# Patient Record
Sex: MALE | Race: WHITE | Employment: OTHER | URBAN - METROPOLITAN AREA
[De-identification: names, ages, dates, MRNs, and addresses within clinical notes are randomized per-mention and may not be internally consistent; named-entity substitution may affect disease eponyms.]

---

## 2017-02-28 ASSESSMENT — ENCOUNTER SYMPTOMS
MUSCLE CRAMPS: 1
EYE PAIN: 1
EYE REDNESS: 0
EYE WATERING: 0
WEAKNESS: 0
WEIGHT LOSS: 0
EYE IRRITATION: 1
NUMBNESS: 1
NIGHT SWEATS: 0
DIZZINESS: 1
DISTURBANCES IN COORDINATION: 0
PARALYSIS: 0
FATIGUE: 1
SEIZURES: 0
MUSCLE WEAKNESS: 1
ARTHRALGIAS: 1
BACK PAIN: 0
HALLUCINATIONS: 0
JOINT SWELLING: 1
STIFFNESS: 1
HEADACHES: 0
NECK PAIN: 1
INCREASED ENERGY: 1
DECREASED APPETITE: 0
TREMORS: 0
POLYDIPSIA: 1
DOUBLE VISION: 1
MYALGIAS: 1
LOSS OF CONSCIOUSNESS: 0
SPEECH CHANGE: 0
FEVER: 1
ALTERED TEMPERATURE REGULATION: 1
WEIGHT GAIN: 0
POLYPHAGIA: 0
MEMORY LOSS: 0
CHILLS: 1
TINGLING: 1

## 2017-03-13 ENCOUNTER — OFFICE VISIT (OUTPATIENT)
Dept: NEUROLOGY | Facility: CLINIC | Age: 77
End: 2017-03-13

## 2017-03-13 VITALS
DIASTOLIC BLOOD PRESSURE: 63 MMHG | OXYGEN SATURATION: 96 % | BODY MASS INDEX: 31.92 KG/M2 | WEIGHT: 223 LBS | SYSTOLIC BLOOD PRESSURE: 116 MMHG | HEIGHT: 70 IN | HEART RATE: 57 BPM

## 2017-03-13 DIAGNOSIS — G70.01 MYASTHENIA GRAVIS WITH EXACERBATION (H): ICD-10-CM

## 2017-03-13 DIAGNOSIS — G70.00 MYASTHENIA GRAVIS WITHOUT EXACERBATION (H): ICD-10-CM

## 2017-03-13 DIAGNOSIS — G70.00 MYASTHENIA GRAVIS WITHOUT EXACERBATION (H): Primary | ICD-10-CM

## 2017-03-13 ASSESSMENT — PAIN SCALES - GENERAL: PAINLEVEL: NO PAIN (0)

## 2017-03-13 NOTE — PROGRESS NOTES
"History of illness:    Cecil Gupta is a 76 year old man who carries a diagnosis of myasthenia gravis. He has previously been seen by Dr. Lovell at [facility] and also at [facility] by Dr. Jenkins. Prior work up has included single fiber (no jitter), ACHR ab and Musk (negative), chest CT (no thymic abnormality), and tensilon test (reportedly positive). It is somewhat hard to know when his ocular symptoms began. He has a long history of vision disturbance which followed retinal detachment 20+ years ago, cataracts (both eyes), and vitrectomy (right eye). In March 2013 his vision changed. At that point he developed marked diplopia. Diplopia worsened throughout the day. Around the same time he developed right eye lid ptosis. Ptosis was less severe on the left. Ptosis seemed to fluctuate somewhat. MG was diagnosed on symptoms and the tensilon test (documented results not available). After the diagnosis he was treated with mestinon. He thinks he was taking 60 mg 3 times daily. He thinks this helped \"a little bit\". He stopped it because it was not well tolerated.  He was then treated prednisone 50 mg daily with a tapering course over several months. He did not feel that prednisone was helpful. Ptosis and diplopia persisted essentially unchanged. He did not tolerate prednisone well at all. In 2014 he started IVIg. He continued 0.8 gm/kg monthly. The dose was stable between 2014 and 2016. He reported some relatively minor treatment related changes (slightly more ptosis and diplopia). In addition to the ocular symptoms he reports 30+ years of trouble swallowing. He has no problem chewing or initiating swallow, but feels that food gets stuck. No dysarthria. He also feels that he becomes short of breath. In 11/14 he also underwent upper lid blepharoplasty and right levator ptosis repair. 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 Around 2013 he was thought to have \"encephalitis\". His symptoms from this was really just a sense of coldness throughout his body and severe fatigue. Other diagnostic data is unknown. He carries a diagnosis of seronegative rheumatoid arthritis. He takes no immunotherapy specific for this condition.       Interval history:  I last saw him 11/14/16. After that visit NCS were performed. No decrement was seen in facial muscles. Over the last few months he has been under an extraordinary amount of stress in his home life. After I last saw him I suggested he decrease his IVIg. He decided not do this. He is still receiving 0.8 gm/kg q 4 weeks. In part due to his life stress he has not been able to keep this schedule. His last dose was 6 weeks ago. During this delay he feels \"colder\". This is his typical symptom that prompts him to take IVIg.  He reports some continued diplopia when he gets tired. This perhaps is a bit worse, but he still experiences diplopia even after IVIg. His ptosis is unchanged today. He has been, by necessity, fairly active over the last few months. He reports feeling \"winded\" with activity. His FVC today is 108% upright and 106% supine. MIP -110.     Prior pertinent laboratory work-up:  None    Prior imaging:  10/16: MRI LS spine showed scattered \"marked\" degenerative changes, partial ankylosis at L2 and L3  10/16: MRI C spine showed moderate to marked degnerative chnages and moderate to marked stenosis at C5C6    Prior electrophysiologic work-up:  8/13: Single fiber EMG of right frontalis showed normal jitter. No evidence for NMJ defect.  6/13: NCS performed at [facility]. Sural, SP, median, ulnar snap's all normal (my interpretation). Left peroneal motor small at 1.5 mV, and tibial at 2 mV. There was some ulnar motor slowing across the elbow. EMG essentially normal other than a few large MUPs in TA and FDI. No convincing decrement.   11/16: NCS of left facial-nasalis and ulnar-ADM 3 " Hz repetitive nerve stimulation was normal. No decrement or exhaustion was present.      Past Medical History:   Past Medical History   Diagnosis Date     Allergic rhinitis      Ankylosing spondylitis (H)      Aortic insufficiency      Arthritis      Asplenia      Traumatic     Carbuncle and furuncle of buttock      Heart attack (H)      Hyperlipidemia      Hypertension      Impotence of organic origin      Major depression, recurrent (H)      MRSA (methicillin resistant staph aureus) culture positive      Narcolepsy without cataplexy      Obesity      Obstructive sleep apnea      Ocular myasthenia (H)      Other chronic pain      Renal insufficiency      Retinal detachment right eye     Rheumatoid lung (H)      Sensory neuropathy (H)      Ulnar neuropathy at elbow      Vitamin D deficiency      Past Surgical History:  Past Surgical History   Procedure Laterality Date     Hrw anes total knee replacement, mda 1  bilateral     Joint replacement, hip rt/lt       Transposition ulnar nerve (elbow)       Splenectomy[       Laser yag capsulotomy bilateral  6/11/2014     Procedure: LASER YAG CAPSULOTOMY BILATERAL;  Surgeon: Geovanni Elliott MD;  Location: [facility]     Septoplasty       C total hip arthroplasty       Retinal reattachment       Blepharoplasty bilateral Bilateral 11/13/2014     Procedure: BLEPHAROPLASTY BILATERAL;  Surgeon: Geovanni Elliott MD;  Location: [facility]     Repair ptosis Right 11/13/2014     Procedure: REPAIR PTOSIS;  Surgeon: Geovanni Elliott MD;  Location: [facility]     Repair ptosis Right 6/22/2015     Procedure: REPAIR PTOSIS;  Surgeon: Geovanni Elliott MD;  Location: [facility]     Family history:    There is no known family history of myopathy or other neuromuscular disorders.    Social History:    He denies tobacco, alcohol, or illicit drug use.     Medical Allergies:     Allergies   Allergen Reactions     Ace Inhibitors      Cats      Diovan [Valsartan]      Irbesartan      Current Medications:   Current  "Outpatient Prescriptions   Medication     immune globulin - sucrose free 10 % injection     amLODIPine-atorvastatin (CADUET) 5-10 MG per tablet     amoxicillin (AMOXIL) 500 MG capsule     B Complex CAPS     chlorthalidone (HYGROTON) 25 MG tablet     VITAMIN D, CHOLECALCIFEROL, PO     guaiFENesin-codeine (ROBITUSSIN AC) 100-10 MG/5ML SOLN     Metoprolol Succinate (TOPROL XL PO)     Modafinil (PROVIGIL PO)     Potassium Chloride Chaya CR (K-DUR PO)     TRAZODONE HCL PO     cetirizine (ZYRTEC) 10 MG tablet     No current facility-administered medications for this visit.      Review of Systems: A complete review of systems was obtained and was negative except for what was noted above.    Physical examination:    /63  Pulse 57  Ht 1.765 m (5' 9.5\")  Wt 101.2 kg (223 lb)  SpO2 96%  BMI 32.46 kg/m2    General Appearance: NAD    Skin: There are no rashes or other skin lesions.    Musculoskeletal:  Pes planus present.     Neurologic examination:    Mental status:  Patient is alert, attentive, and oriented x 3.  Language is coherent and fluent without dysarthria or aphasia.  Memory, comprehension and ability to follow commands were intact.       Cranial nerves:  There is a slight degree of left eye ptosis at baseline, and eye closure is moderately weak. Ptosis gets a bit worse after sustained upgaze. No diplopia at baseline, but there was diplopia after brief lateral gaze. There was no jaw, palate or tongue weakness or atrophy. Hearing was grossly intact.  Shoulder shrug was normal.       Motor exam: No atrophy or fasciculations. He has some mild FDI weakness on both sides, but otherwise MRC grade 5/5 strength throughout the remaining proximal and distal muscles of the arms and legs. He can easily rise from a seated position without assistance.     Complex motor skills: Mild postural hand tremor. No ataxia     Sensory exam: Decreased pin in toes, otherwise normal. Vibration normal.     Gait was narrow and stable     Deep " "tendon reflexes:   Right Left   Triceps 2 2   Biceps 2 2   Brachioradialis 2 2   Knee jerk 2 2   Ankle jerk 1 1   Plantar responses were flexor bilaterally.       Assessment:    Cecil Gupta is a 76 year old man with a very complex history of fluctuating diplopia and ptosis, within the context of many other ocular conditions including retinal detachment, cataracts surgery, vitrectomy, corneal transplant, upper lid blepharoplasty and right levator ptosis repair. Recent RNS showed no decrement in facial muscles. 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 He remains on IVIg and reports subjective improvements in \"coldness\", perhaps ptosis as well. I do not understand the cold sensation he experiences, or why this improves with IVIg. It is not from MG. Even so, it is a meaningful benefit for him. I again discussed MG treatment with him today, and again recommended dose reduction. Knowing that he is now 6 weeks out from MG with normal spirometry and essentially stable examination is reassuring. We discussed this in some detail today. He will continue IVIg under the direction of Dr. Lovell. I am more than happy to become more engaged if needed. I will see Mr. Gupta back in 6 months. Additional IVIg weaning may be recommended pending clinical course.     Plan:      1. Decrease IVIg from 0.8 gm/kg q 4 weeks to 0.4 gm/kg q 4 weeks.  2. If stable at next visit may suggest further tapering, probably by extending infusion interval to q 6 qweeks.   3. I will leave IVIg prescribing to the excellent case that he has received from Dr. Lovell, but am happy to become more involved as needed.  4. I will see him back in 6 months. Sooner if needed.   ---      "

## 2017-03-13 NOTE — LETTER
"3/13/2017       RE: Cecil Gupta  [address] [place] [address]     Dear Colleague,    Thank you for referring your patient, Cecil Gupta, to the [facility]UROLOG[facility] at [facility]. Please see a copy of my visit note below.    History of illness:    Cecil Gupta is a 76 year old man who carries a diagnosis of myasthenia gravis. He has previously been seen by Dr. Lovell at [facility] and also at [facility] by Dr. Jenkins. Prior work up has included single fiber (no jitter), ACHR ab and Musk (negative), chest CT (no thymic abnormality), and tensilon test (reportedly positive). It is somewhat hard to know when his ocular symptoms began. He has a long history of vision disturbance which followed retinal detachment 20+ years ago, cataracts (both eyes), and vitrectomy (right eye). In March 2013 his vision changed. At that point he developed marked diplopia. Diplopia worsened throughout the day. Around the same time he developed right eye lid ptosis. Ptosis was less severe on the left. Ptosis seemed to fluctuate somewhat. MG was diagnosed on symptoms and the tensilon test (documented results not available). After the diagnosis he was treated with mestinon. He thinks he was taking 60 mg 3 times daily. He thinks this helped \"a little bit\". He stopped it because it was not well tolerated.  He was then treated prednisone 50 mg daily with a tapering course over several months. He did not feel that prednisone was helpful. Ptosis and diplopia persisted essentially unchanged. He did not tolerate prednisone well at all. In 2014 he started IVIg. He continued 0.8 gm/kg monthly. The dose was stable between 2014 and 2016. He reported some relatively minor treatment related changes (slightly more ptosis and diplopia). In addition to the ocular symptoms he reports 30+ years of trouble swallowing. He has no problem chewing or initiating swallow, but feels that food gets " "stuck. No dysarthria. He also feels that he becomes short of breath. In 11/14 he also underwent upper lid blepharoplasty and right levator ptosis repair. This is on top of the remote retinal detachment and right eye vitrectomy as well as cataract surgery in both eyes and (in 2016) a right corneal transplant. Around 2013 he was thought to have \"encephalitis\". His symptoms from this was really just a sense of coldness throughout his body and severe fatigue. Other diagnostic data is unknown. He carries a diagnosis of seronegative rheumatoid arthritis. He takes no immunotherapy specific for this condition.       Interval history:  I last saw him 11/14/16. After that visit NCS were performed. No decrement was seen in facial muscles. Over the last few months he has been under an extraordinary amount of stress in his home life. After I last saw him I suggested he decrease his IVIg. He decided not do this. He is still receiving 0.8 gm/kg q 4 weeks. In part due to his life stress he has not been able to keep this schedule. His last dose was 6 weeks ago. During this delay he feels \"colder\". This is his typical symptom that prompts him to take IVIg.  He reports some continued diplopia when he gets tired. This perhaps is a bit worse, but he still experiences diplopia even after IVIg. His ptosis is unchanged today. He has been, by necessity, fairly active over the last few months. He reports feeling \"winded\" with activity. His FVC today is 108% upright and 106% supine. MIP -110.     Prior pertinent laboratory work-up:  None    Prior imaging:  10/16: MRI LS spine showed scattered \"marked\" degenerative changes, partial ankylosis at L2 and L3  10/16: MRI C spine showed moderate to marked degnerative chnages and moderate to marked stenosis at C5C6    Prior electrophysiologic work-up:  8/13: Single fiber EMG of right frontalis showed normal jitter. No evidence for NMJ defect.  6/13: NCS performed at [facility]. Sural, SP, median, ulnar " snap's all normal (my interpretation). Left peroneal motor small at 1.5 mV, and tibial at 2 mV. There was some ulnar motor slowing across the elbow. EMG essentially normal other than a few large MUPs in TA and FDI. No convincing decrement.   11/16: NCS of left facial-nasalis and ulnar-ADM 3 Hz repetitive nerve stimulation was normal. No decrement or exhaustion was present.      Past Medical History:   Past Medical History   Diagnosis Date     Allergic rhinitis      Ankylosing spondylitis (H)      Aortic insufficiency      Arthritis      Asplenia      Traumatic     Carbuncle and furuncle of buttock      Heart attack (H)      Hyperlipidemia      Hypertension      Impotence of organic origin      Major depression, recurrent (H)      MRSA (methicillin resistant staph aureus) culture positive      Narcolepsy without cataplexy      Obesity      Obstructive sleep apnea      Ocular myasthenia (H)      Other chronic pain      Renal insufficiency      Retinal detachment right eye     Rheumatoid lung (H)      Sensory neuropathy (H)      Ulnar neuropathy at elbow      Vitamin D deficiency      Past Surgical History:  Past Surgical History   Procedure Laterality Date     Hrw anes total knee replacement, mda 1  bilateral     Joint replacement, hip rt/lt       Transposition ulnar nerve (elbow)       Splenectomy[       Laser yag capsulotomy bilateral  6/11/2014     Procedure: LASER YAG CAPSULOTOMY BILATERAL;  Surgeon: Geovanni Elliott MD;  Location: [facility]     Septoplasty       C total hip arthroplasty       Retinal reattachment       Blepharoplasty bilateral Bilateral 11/13/2014     Procedure: BLEPHAROPLASTY BILATERAL;  Surgeon: Geovanni Elliott MD;  Location: [facility]     Repair ptosis Right 11/13/2014     Procedure: REPAIR PTOSIS;  Surgeon: Geovanni Elliott MD;  Location: [facility]     Repair ptosis Right 6/22/2015     Procedure: REPAIR PTOSIS;  Surgeon: Geovanni Elliott MD;  Location: [facility]     Family history:    There is no known family  "history of myopathy or other neuromuscular disorders.    Social History:    He denies tobacco, alcohol, or illicit drug use.     Medical Allergies:     Allergies   Allergen Reactions     Ace Inhibitors      Cats      Diovan [Valsartan]      Irbesartan      Current Medications:   Current Outpatient Prescriptions   Medication     immune globulin - sucrose free 10 % injection     amLODIPine-atorvastatin (CADUET) 5-10 MG per tablet     amoxicillin (AMOXIL) 500 MG capsule     B Complex CAPS     chlorthalidone (HYGROTON) 25 MG tablet     VITAMIN D, CHOLECALCIFEROL, PO     guaiFENesin-codeine (ROBITUSSIN AC) 100-10 MG/5ML SOLN     Metoprolol Succinate (TOPROL XL PO)     Modafinil (PROVIGIL PO)     Potassium Chloride Chaya CR (K-DUR PO)     TRAZODONE HCL PO     cetirizine (ZYRTEC) 10 MG tablet     No current facility-administered medications for this visit.      Review of Systems: A complete review of systems was obtained and was negative except for what was noted above.    Physical examination:    /63  Pulse 57  Ht 1.765 m (5' 9.5\")  Wt 101.2 kg (223 lb)  SpO2 96%  BMI 32.46 kg/m2    General Appearance: NAD    Skin: There are no rashes or other skin lesions.    Musculoskeletal:  Pes planus present.     Neurologic examination:    Mental status:  Patient is alert, attentive, and oriented x 3.  Language is coherent and fluent without dysarthria or aphasia.  Memory, comprehension and ability to follow commands were intact.       Cranial nerves:  There is a slight degree of left eye ptosis at baseline, and eye closure is moderately weak. Ptosis gets a bit worse after sustained upgaze. No diplopia at baseline, but there was diplopia after brief lateral gaze. There was no jaw, palate or tongue weakness or atrophy. Hearing was grossly intact.  Shoulder shrug was normal.       Motor exam: No atrophy or fasciculations. He has some mild FDI weakness on both sides, but otherwise MRC grade 5/5 strength throughout the remaining " "proximal and distal muscles of the arms and legs. He can easily rise from a seated position without assistance.     Complex motor skills: Mild postural hand tremor. No ataxia     Sensory exam: Decreased pin in toes, otherwise normal. Vibration normal.     Gait was narrow and stable     Deep tendon reflexes:   Right Left   Triceps 2 2   Biceps 2 2   Brachioradialis 2 2   Knee jerk 2 2   Ankle jerk 1 1   Plantar responses were flexor bilaterally.       Assessment:    Cecil Gupta is a 76 year old man with a very complex history of fluctuating diplopia and ptosis, within the context of many other ocular conditions including retinal detachment, cataracts surgery, vitrectomy, corneal transplant, upper lid blepharoplasty and right levator ptosis repair. Recent RNS showed no decrement in facial muscles. 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 He remains on IVIg and reports subjective improvements in \"coldness\", perhaps ptosis as well. I do not understand the cold sensation he experiences, or why this improves with IVIg. It is not from MG. Even so, it is a meaningful benefit for him. I again discussed MG treatment with him today, and again recommended dose reduction. Knowing that he is now 6 weeks out from MG with normal spirometry and essentially stable examination is reassuring. We discussed this in some detail today. He will continue IVIg under the direction of Dr. Lovell. I am more than happy to become more engaged if needed. I will see Mr. Gupta back in 6 months. Additional IVIg weaning may be recommended pending clinical course.     Plan:      1. Decrease IVIg from 0.8 gm/kg q 4 weeks to 0.4 gm/kg q 4 weeks.  2. If stable at next visit may suggest further tapering, probably by extending infusion interval to q 6 qweeks.   3. I will leave IVIg prescribing to the excellent case that he has received from Dr. Lovell, but am happy to become more involved as needed.  4. I will " see him back in 6 months. Sooner if needed.   ---      Again, thank you for allowing me to participate in the care of your patient.      Sincerely,    Kodi Kirkpatrick MD

## 2017-03-13 NOTE — MR AVS SNAPSHOT
After Visit Summary   3/13/2017    Cecil Gupta    MRN: [id number]           Patient Information     Date Of Birth          1940        Visit Information        Provider Department      3/13/2017 8:00 AM Kodi Kirkpatrick MD [facility] Neurology        Today's Diagnoses     Myasthenia gravis without exacerbation (H)    -  1    Myasthenia gravis with exacerbation (H)           Follow-ups after your visit        Follow-up notes from your care team     Return in about 6 months (around 9/13/2017).      Who to contact     Please call your clinic at [phone] to:    Ask questions about your health    Make or cancel appointments    Discuss your medicines    Learn about your test results    Speak to your doctor   If you have compliments or concerns about an experience at your clinic, or if you wish to file a complaint, please contact [facility] Patient Relations at [phone] or email us at [email or web address]         Additional Information About Your Visit        MyChart Information     [employer] gives you secure access to your electronic health record. If you see a primary care provider, you can also send messages to your care team and make appointments. If you have questions, please call your primary care clinic.  If you do not have a primary care provider, please call [phone] and they will assist you.      [employer] is an electronic gateway that provides easy, online access to your medical records. With [employer], you can request a clinic appointment, read your test results, renew a prescription or communicate with your care team.     To access your existing account, please contact your [facility] or call [phone] for assistance.        Care EveryWhere ID     This is your Care EveryWhere ID. This could be used by other organizations to access your [place] medical records  [id number]        Your Vitals Were      "Pulse Height Pulse Oximetry BMI (Body Mass Index)          57 1.765 m (5' 9.5\") 96% 32.46 kg/m2         Blood Pressure from Last 3 Encounters:   03/13/17 116/63   11/14/16 130/73   06/22/15 103/68    Weight from Last 3 Encounters:   03/13/17 101.2 kg (223 lb)   06/19/15 98.7 kg (217 lb 9.6 oz)   06/16/15 98.9 kg (218 lb)               Primary Care Provider Office Phone # Fax #    Herman Monahan [phone]       [facility] [address] [facility] [address]        Thank you!     Thank you for choosing [facility] NEUROLOGY  for your care. Our goal is always to provide you with excellent care. Hearing back from our patients is one way we can continue to improve our services. Please take a few minutes to complete the written survey that you may receive in the mail after your visit with us. Thank you!             Your Updated Medication List - Protect others around you: Learn how to safely use, store and throw away your medicines at www.disposemymeds.org.          This list is accurate as of: 3/13/17  9:05 AM.  Always use your most recent med list.                   Brand Name Dispense Instructions for use    amLODIPine-atorvastatin 5-10 MG per tablet    CADUET     Take 1 tablet by mouth daily       amoxicillin 500 MG capsule    AMOXIL     Take 500 mg by mouth Four caps on ehour before dental work       B Complex Caps      Take 1 capsule by mouth daily       cetirizine 10 MG tablet    zyrTEC     Take 10 mg by mouth daily       chlorthalidone 25 MG tablet    HYGROTON     Take 25 mg by mouth daily       guaiFENesin-codeine 100-10 MG/5ML Soln solution    ROBITUSSIN AC     Take 5 mLs by mouth every 4 hours as needed for cough Maximum dose 60 ml per 24 hrs.       immune globulin - sucrose free 10 % injection      Inject into the vein once Every 4 weeks       K-DUR PO      Take 20 mEq by mouth 2 times daily       PROVIGIL PO      Take 100 mg by mouth daily       TOPROL XL PO      Take 25 mg by " mouth daily       TRAZODONE HCL PO      Take 50 mg by mouth nightly as needed for sleep       VITAMIN D (CHOLECALCIFEROL) PO      Take 2,000 Units by mouth daily

## 2017-03-29 LAB
EXPTIME-PRE: 7.95 SEC
FEF2575-%PRED-POST: 156 %
FEF2575-%PRED-PRE: 170 %
FEF2575-POST: 3.54 L/SEC
FEF2575-PRE: 3.84 L/SEC
FEF2575-PRED: 2.26 L/SEC
FEFMAX-%PRED-PRE: 123 %
FEFMAX-PRE: 9.76 L/SEC
FEFMAX-PRED: 7.92 L/SEC
FEV1-%PRED-PRE: 119 %
FEV1-PRE: 3.68 L
FEV1FEV6-PRE: 83 %
FEV1FEV6-PRED: 77 %
FEV1FVC-PRE: 83 %
FEV1FVC-PRED: 75 %
FIFMAX-PRE: 7.26 L/SEC
FVC-%PRED-PRE: 108 %
FVC-PRE: 4.45 L
FVC-PRED: 4.09 L
MEP-PRE: 100 CMH2O
MIP-PRE: -110 CMH2O

## 2020-03-02 ENCOUNTER — HEALTH MAINTENANCE LETTER (OUTPATIENT)
Age: 80
End: 2020-03-02

## 2020-12-20 ENCOUNTER — HEALTH MAINTENANCE LETTER (OUTPATIENT)
Age: 80
End: 2020-12-20

## 2021-04-18 ENCOUNTER — HEALTH MAINTENANCE LETTER (OUTPATIENT)
Age: 81
End: 2021-04-18

## 2021-10-03 ENCOUNTER — HEALTH MAINTENANCE LETTER (OUTPATIENT)
Age: 81
End: 2021-10-03

## 2022-05-15 ENCOUNTER — HEALTH MAINTENANCE LETTER (OUTPATIENT)
Age: 82
End: 2022-05-15

## 2022-09-04 ENCOUNTER — HEALTH MAINTENANCE LETTER (OUTPATIENT)
Age: 82
End: 2022-09-04

## 2023-06-03 ENCOUNTER — HEALTH MAINTENANCE LETTER (OUTPATIENT)
Age: 83
End: 2023-06-03

## 2025-07-02 ENCOUNTER — TRANSCRIBE ORDERS (OUTPATIENT)
Dept: OTHER | Age: 85
End: 2025-07-02

## 2025-07-02 DIAGNOSIS — I21.4 NSTEMI (NON-ST ELEVATED MYOCARDIAL INFARCTION) (H): Primary | ICD-10-CM
